# Patient Record
(demographics unavailable — no encounter records)

---

## 2025-06-10 NOTE — CONSULT LETTER
[Dear  ___] : Dear  [unfilled], [Consult Letter:] : I had the pleasure of evaluating your patient, [unfilled]. [Please see my note below.] : Please see my note below. [Consult Closing:] : Thank you very much for allowing me to participate in the care of this patient.  If you have any questions, please do not hesitate to contact me. [Sincerely,] : Sincerely, [FreeTextEntry3] : Jamari Renee MD Co-Director [facility]

## 2025-06-10 NOTE — ASSESSMENT
[FreeTextEntry1] : My impression is that the patient is suffering from right de Quervain's tenosynovitis. I therefore recommended that the patient undergo a first dorsal compartment injection. The risks benefits and alternatives were discussed with the patient. This included (but was not limited to) subcutaneous atrophy, depigmentation, nerve injury, RSD, infection etc... the patient agreed and under informed consent and sterile conditions 1/2 cc of Kenalog-10 and 1/2 cc of 2% plain lidocaine was precisely injected into the first dorsal compartment. It is my hope that this significantly alleviates the symptoms. Should the symptoms not disappear or should they recur then the patient should contact me. All questions were answered.

## 2025-06-10 NOTE — PHYSICAL EXAM
[de-identified] : PA lateral oblique x-rays of both wrist comparison demonstrate dorsal radial wrist swelling on the right

## 2025-06-10 NOTE — HISTORY OF PRESENT ILLNESS
[Right] : right hand dominant [FreeTextEntry1] : Pt presents for right radial sided wrist pain for the last four months, since approximately Feb 2025. Pt states that she has had similar symptoms around 10 years ago, but states she thinks she was diagnosed with carpal tunnel syndrome back then and went to therapy with full resolve up until this year. Pt does not recall having as much tenderness/pain last time she was treated for a hand/wrist issue 10 years ago. Pt states she has tried OTC splinting, anti-inflammatories, and massage/acupuncture, topicals such as CBD cream, which has collectively provided some relief but has persisted despite these other treatment methods. Pt denies numbness/tingling, mostly just complains of worsening tenderness/pain especially with gripping and using her computer all day.   Pt denies any injuries/falls, previous injections/hand surgeries.